# Patient Record
Sex: MALE | Race: WHITE | URBAN - NONMETROPOLITAN AREA
[De-identification: names, ages, dates, MRNs, and addresses within clinical notes are randomized per-mention and may not be internally consistent; named-entity substitution may affect disease eponyms.]

---

## 2021-02-03 ENCOUNTER — OFFICE VISIT (OUTPATIENT)
Dept: PRIMARY CARE CLINIC | Age: 31
End: 2021-02-03
Payer: MEDICAID

## 2021-02-03 VITALS
SYSTOLIC BLOOD PRESSURE: 142 MMHG | HEIGHT: 73 IN | RESPIRATION RATE: 18 BRPM | HEART RATE: 98 BPM | OXYGEN SATURATION: 98 % | BODY MASS INDEX: 41.75 KG/M2 | TEMPERATURE: 97.7 F | WEIGHT: 315 LBS | DIASTOLIC BLOOD PRESSURE: 86 MMHG

## 2021-02-03 DIAGNOSIS — Z13.220 SCREENING CHOLESTEROL LEVEL: ICD-10-CM

## 2021-02-03 DIAGNOSIS — Z86.16 HISTORY OF COVID-19: Primary | ICD-10-CM

## 2021-02-03 DIAGNOSIS — E66.01 OBESITY, MORBID (MORE THAN 100 LBS OVER IDEAL WEIGHT OR BMI > 40) (HCC): ICD-10-CM

## 2021-02-03 DIAGNOSIS — R03.0 ELEVATED BLOOD-PRESSURE READING WITHOUT DIAGNOSIS OF HYPERTENSION: ICD-10-CM

## 2021-02-03 PROCEDURE — G8427 DOCREV CUR MEDS BY ELIG CLIN: HCPCS | Performed by: FAMILY MEDICINE

## 2021-02-03 PROCEDURE — G8484 FLU IMMUNIZE NO ADMIN: HCPCS | Performed by: FAMILY MEDICINE

## 2021-02-03 PROCEDURE — 99204 OFFICE O/P NEW MOD 45 MIN: CPT | Performed by: FAMILY MEDICINE

## 2021-02-03 PROCEDURE — G8417 CALC BMI ABV UP PARAM F/U: HCPCS | Performed by: FAMILY MEDICINE

## 2021-02-03 PROCEDURE — 1036F TOBACCO NON-USER: CPT | Performed by: FAMILY MEDICINE

## 2021-02-03 SDOH — HEALTH STABILITY: MENTAL HEALTH: HOW MANY STANDARD DRINKS CONTAINING ALCOHOL DO YOU HAVE ON A TYPICAL DAY?: NOT ASKED

## 2021-02-03 ASSESSMENT — ENCOUNTER SYMPTOMS
COUGH: 0
ABDOMINAL PAIN: 0
SHORTNESS OF BREATH: 1
DIARRHEA: 0
CHEST TIGHTNESS: 0
CONSTIPATION: 0

## 2021-02-03 NOTE — PROGRESS NOTES
2/3/21  Fran Whitt : 1990 Sex: male  Age: 35 y.o. Assessment and Plan:  Greta King was seen today for establish care. Diagnoses and all orders for this visit:    History of COVID-19  -     Basic Metabolic Panel; Future  -     Hepatic Function Panel; Future  -     CBC Auto Differential; Future  -     Lipid Panel; Future    Screening cholesterol level  -     Basic Metabolic Panel; Future  -     Hepatic Function Panel; Future  -     CBC Auto Differential; Future  -     Lipid Panel; Future    Obesity, morbid (more than 100 lbs over ideal weight or BMI > 40) (HCC)  -     Basic Metabolic Panel; Future  -     Hepatic Function Panel; Future  -     CBC Auto Differential; Future  -     Lipid Panel; Future    Elevated blood-pressure reading without diagnosis of hypertension    Return to work note given for  given. Update routine annual blood work, further recommendations pending results. Patient's partner will check his blood pressure 1-2 times per month and he will keep a record of this. Follow-up in approximately 8 weeks to reassess in office. Return in about 8 weeks (around 3/31/2021).       Chief Complaint   Patient presents with   [facility] Establish Care       HPI Patient is here today to establish care. He recently had COVID-19. His initial symptoms started on January 21 22nd. He just had congestion and thought initially it was allergies or sinus infection. However later on that weekend he lost his sense of smell and taste, and got himself tested. He really just had congestion, some cough, and \"heart palpitations. \"  On further questioning, he states that he felt his heart was racing actually, there was no skipping or flip-flops. There was no associated shortness of breath, dizziness or chest pain with these episodes. He never had a fever. He is well past his 10-day quarantine at this point and does feel better. He notes that he still feels a little bit short of breath if he does too much; he was taking down decorations from [place] and moving them downstairs into a shed, and felt he had to take more breaths then would have been normal.  However for the 14 days he was quarantined, he was exclusively confined to his bedroom, and thus there may be some deconditioning as well. 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 He is not sure he is ready to go back just yet, but thinks by Monday he will have more of his strength back and be ready to properly care for his patient. Patient is otherwise generally healthy. He states that his blood pressure is usually elevated when he comes to the doctors. Denies any other health history of concern. Diabetes type 2 and cardiac disease do run in the family. He would like to update his routine annual blood work. Problem list reviewed and updated in full with patient today as necessary. A comprehensive ROS was negative, except as documented above. Review of Systems   Constitutional: Negative for fever. Respiratory: Positive for shortness of breath. Negative for cough and chest tightness. Cardiovascular: Negative for chest pain. Gastrointestinal: Negative for abdominal pain, constipation and diarrhea. Genitourinary: Negative. Musculoskeletal: Negative for arthralgias and myalgias. Neurological: Negative for dizziness and light-headedness. Psychiatric/Behavioral: Negative for behavioral problems. All other systems reviewed and are negative. No current outpatient medications on file. No Known Allergies    Pt's past medical and surgical history were updated as necessary today   Pt's family and social history were updated as necessary today        Vitals:    02/03/21 0930 02/03/21 0933   BP: (!) 142/86 (!) 142/86   Pulse: 98    Resp: 18    Temp: 97.7 °F (36.5 °C)    TempSrc: Temporal    SpO2: 98%    Weight: (!) 321 lb (145.6 kg)    Height: 6' 1\" (1.854 m)        Physical Exam  Constitutional:       Appearance: Normal appearance. He is obese. HENT:      Head: Normocephalic and atraumatic. Eyes:      Conjunctiva/sclera: Conjunctivae normal.   Cardiovascular:      Rate and Rhythm: Normal rate and regular rhythm. Heart sounds: Normal heart sounds. Pulmonary:      Effort: Pulmonary effort is normal.      Breath sounds: Normal breath sounds. Abdominal:      Palpations: Abdomen is soft. Tenderness: There is no abdominal tenderness. Musculoskeletal: Normal range of motion. Skin:     General: Skin is warm and dry. Neurological:      General: No focal deficit present. Mental Status: He is alert and oriented to person, place, and time.    Psychiatric:         Mood and Affect: Mood normal.         Behavior: Behavior normal.               Seen By:  Juan Antony MD

## 2021-02-03 NOTE — LETTER
NOTIFICATION RETURN TO WORK / SCHOOL    2/3/2021    Mr. Dwayne Moreno  [facility] [phone]      To Whom It May Concern:    Dwayne Moreno has completed his quarantine for COVID and is acceptable to return to work Monday 2/8/2021 per CDC guidelines. If there are questions or concerns, please have the patient contact our office.         Sincerely,      Brionna Chua MD

## 2021-02-05 DIAGNOSIS — Z86.16 HISTORY OF COVID-19: ICD-10-CM

## 2021-02-05 DIAGNOSIS — E66.01 OBESITY, MORBID (MORE THAN 100 LBS OVER IDEAL WEIGHT OR BMI > 40) (HCC): ICD-10-CM

## 2021-02-05 DIAGNOSIS — Z13.220 SCREENING CHOLESTEROL LEVEL: ICD-10-CM

## 2021-02-05 LAB
ALBUMIN SERPL-MCNC: 4.7 G/DL (ref 3.5–5.2)
ALP BLD-CCNC: 72 U/L (ref 40–129)
ALT SERPL-CCNC: 59 U/L (ref 0–40)
ANION GAP SERPL CALCULATED.3IONS-SCNC: 13 MMOL/L (ref 7–16)
AST SERPL-CCNC: 35 U/L (ref 0–39)
BASOPHILS ABSOLUTE: 0.03 E9/L (ref 0–0.2)
BASOPHILS RELATIVE PERCENT: 0.6 % (ref 0–2)
BILIRUB SERPL-MCNC: 0.5 MG/DL (ref 0–1.2)
BILIRUBIN DIRECT: <0.2 MG/DL (ref 0–0.3)
BILIRUBIN, INDIRECT: ABNORMAL MG/DL (ref 0–1)
BUN BLDV-MCNC: 10 MG/DL (ref 6–20)
CALCIUM SERPL-MCNC: 9.6 MG/DL (ref 8.6–10.2)
CHLORIDE BLD-SCNC: 100 MMOL/L (ref 98–107)
CHOLESTEROL, TOTAL: 204 MG/DL (ref 0–199)
CO2: 24 MMOL/L (ref 22–29)
CREAT SERPL-MCNC: 0.9 MG/DL (ref 0.7–1.2)
EOSINOPHILS ABSOLUTE: 0.09 E9/L (ref 0.05–0.5)
EOSINOPHILS RELATIVE PERCENT: 1.9 % (ref 0–6)
GFR AFRICAN AMERICAN: >60
GFR NON-AFRICAN AMERICAN: >60 ML/MIN/1.73
GLUCOSE BLD-MCNC: 88 MG/DL (ref 74–99)
HCT VFR BLD CALC: 42.7 % (ref 37–54)
HDLC SERPL-MCNC: 34 MG/DL
HEMOGLOBIN: 14.3 G/DL (ref 12.5–16.5)
IMMATURE GRANULOCYTES #: 0.01 E9/L
IMMATURE GRANULOCYTES %: 0.2 % (ref 0–5)
LDL CHOLESTEROL CALCULATED: 143 MG/DL (ref 0–99)
LYMPHOCYTES ABSOLUTE: 2.01 E9/L (ref 1.5–4)
LYMPHOCYTES RELATIVE PERCENT: 42.3 % (ref 20–42)
MCH RBC QN AUTO: 29.4 PG (ref 26–35)
MCHC RBC AUTO-ENTMCNC: 33.5 % (ref 32–34.5)
MCV RBC AUTO: 87.7 FL (ref 80–99.9)
MONOCYTES ABSOLUTE: 0.44 E9/L (ref 0.1–0.95)
MONOCYTES RELATIVE PERCENT: 9.3 % (ref 2–12)
NEUTROPHILS ABSOLUTE: 2.17 E9/L (ref 1.8–7.3)
NEUTROPHILS RELATIVE PERCENT: 45.7 % (ref 43–80)
PDW BLD-RTO: 12.6 FL (ref 11.5–15)
PLATELET # BLD: 312 E9/L (ref 130–450)
PMV BLD AUTO: 9.6 FL (ref 7–12)
POTASSIUM SERPL-SCNC: 4.5 MMOL/L (ref 3.5–5)
RBC # BLD: 4.87 E12/L (ref 3.8–5.8)
SODIUM BLD-SCNC: 137 MMOL/L (ref 132–146)
TOTAL PROTEIN: 7.8 G/DL (ref 6.4–8.3)
TRIGL SERPL-MCNC: 133 MG/DL (ref 0–149)
VLDLC SERPL CALC-MCNC: 27 MG/DL
WBC # BLD: 4.8 E9/L (ref 4.5–11.5)

## 2021-05-04 ENCOUNTER — OFFICE VISIT (OUTPATIENT)
Dept: PRIMARY CARE CLINIC | Age: 31
End: 2021-05-04
Payer: MEDICAID

## 2021-05-04 VITALS
HEART RATE: 83 BPM | BODY MASS INDEX: 39.1 KG/M2 | RESPIRATION RATE: 18 BRPM | HEIGHT: 73 IN | SYSTOLIC BLOOD PRESSURE: 132 MMHG | WEIGHT: 295 LBS | DIASTOLIC BLOOD PRESSURE: 84 MMHG | TEMPERATURE: 97.5 F | OXYGEN SATURATION: 98 %

## 2021-05-04 DIAGNOSIS — J01.00 ACUTE NON-RECURRENT MAXILLARY SINUSITIS: Primary | ICD-10-CM

## 2021-05-04 DIAGNOSIS — R79.89 ELEVATED LFTS: ICD-10-CM

## 2021-05-04 PROCEDURE — G8427 DOCREV CUR MEDS BY ELIG CLIN: HCPCS | Performed by: FAMILY MEDICINE

## 2021-05-04 PROCEDURE — G8417 CALC BMI ABV UP PARAM F/U: HCPCS | Performed by: FAMILY MEDICINE

## 2021-05-04 PROCEDURE — 1036F TOBACCO NON-USER: CPT | Performed by: FAMILY MEDICINE

## 2021-05-04 PROCEDURE — 99213 OFFICE O/P EST LOW 20 MIN: CPT | Performed by: FAMILY MEDICINE

## 2021-05-04 RX ORDER — METHYLPREDNISOLONE 4 MG/1
TABLET ORAL
Qty: 21 TABLET | Refills: 0 | Status: SHIPPED | OUTPATIENT
Start: 2021-05-04

## 2021-05-04 ASSESSMENT — PATIENT HEALTH QUESTIONNAIRE - PHQ9
2. FEELING DOWN, DEPRESSED OR HOPELESS: 0
SUM OF ALL RESPONSES TO PHQ QUESTIONS 1-9: 0
SUM OF ALL RESPONSES TO PHQ QUESTIONS 1-9: 0
SUM OF ALL RESPONSES TO PHQ9 QUESTIONS 1 & 2: 0
1. LITTLE INTEREST OR PLEASURE IN DOING THINGS: 0

## 2021-05-04 NOTE — LETTER
[address]  Phone: [phone]  Fax: [phone]    Santiago Gomez MD        May 4, 2021     Patient: Janice Estrada   YOB: 1990   Date of Visit: 5/4/2021       To Whom it May Concern:    Tish Hilliard was seen in my clinic on 5/4/2021. If you have any questions or concerns, please don't hesitate to call.     Sincerely,         Santiago Gomez MD

## 2021-05-04 NOTE — PROGRESS NOTES
21  Balta Clau : 1990 Sex: male  Age: 35 y.o. Assessment and Plan:  Cedrick Thacker was seen today for 3 month follow-up. Diagnoses and all orders for this visit:    Acute non-recurrent maxillary sinusitis  -     methylPREDNISolone (MEDROL DOSEPACK) 4 MG tablet; Take by mouth as per dosepak instructions. Elevated LFTs  -     HEPATIC FUNCTION PANEL; Future    We will treat his presumed allergic sinusitis with a Medrol Dosepak. Precautions reviewed. We will plan to recheck his blood pressure again in 6 months. Repeat LFTs prior. Patient was congratulated on his weight loss and encouraged to continue with his lifestyle efforts. Return in about 6 months (around 2021).         Chief Complaint   Patient presents with    3 Month Follow-Up       HPI   Here for routine f/u  He is overall doing well    Pt is noting some sinus congestion, worried he might be getting an infection  Was on vacation last week in [place] and sx started there  Congested  Mucous now yellow  No fevers  Doesn't really feel sick, \"just congested\"  Used Sudafed with some relief   Pt admits to underlying allergies but doesn't get a lot of improvement from anti-histamines     BP good today   He is feeling well  Has lost some weight - not working overnight now and watching what he's eating now  New job, working for [employer] from February reviewed:  Component      Latest Ref Rng & Units 2021           9:37 AM  9:37 AM  9:37 AM  9:37 AM   WBC      4.5 - 11.5 E9/L    4.8   RBC      3.80 - 5.80 E12/L    4.87   Hemoglobin Quant      12.5 - 16.5 g/dL    14.3   Hematocrit      37.0 - 54.0 %    42.7   MCV      80.0 - 99.9 fL    87.7   MCH      26.0 - 35.0 pg    29.4   MCHC      32.0 - 34.5 %    33.5   RDW      11.5 - 15.0 fL    12.6   Platelet Count      575 - 450 E9/L    312   MPV      7.0 - 12.0 fL    9.6   Neutrophils %      43.0 - 80.0 %    45.7   Immature Granulocytes %      0.0 - 5.0 % 0.2   Lymphocyte %      20.0 - 42.0 %    42.3 (H)   Monocytes %      2.0 - 12.0 %    9.3   Eosinophils %      0.0 - 6.0 %    1.9   Basophils %      0.0 - 2.0 %    0.6   Neutrophils Absolute      1.80 - 7.30 E9/L    2.17   Immature Granulocytes #      E9/L    0.01   Lymphocytes Absolute      1.50 - 4.00 E9/L    2.01   Monocytes Absolute      0.10 - 0.95 E9/L    0.44   Eosinophils Absolute      0.05 - 0.50 E9/L    0.09   Basophils Absolute      0.00 - 0.20 E9/L    0.03   Sodium      132 - 146 mmol/L   137    Potassium      3.5 - 5.0 mmol/L   4.5    Chloride      98 - 107 mmol/L   100    CO2      22 - 29 mmol/L   24    Anion Gap      7 - 16 mmol/L   13    Glucose      74 - 99 mg/dL   88    BUN      6 - 20 mg/dL   10    Creatinine      0.7 - 1.2 mg/dL   0.9    GFR Non-African American      >=60 mL/min/1.73   >60    GFR          >60    Calcium      8.6 - 10.2 mg/dL   9.6    Total Protein      6.4 - 8.3 g/dL  7.8     Albumin      3.5 - 5.2 g/dL  4.7     Alk Phos      40 - 129 U/L  72     ALT      0 - 40 U/L  59 (H)     AST      0 - 39 U/L  35     Bilirubin      0.0 - 1.2 mg/dL  0.5     Bilirubin, Direct      0.0 - 0.3 mg/dL  <0.2     Bilirubin, Indirect      0.0 - 1.0 mg/dL  see below     Cholesterol, Total      0 - 199 mg/dL 204 (H)      Triglycerides      0 - 149 mg/dL 133      HDL Cholesterol      >40 mg/dL 34      LDL Calculated      0 - 99 mg/dL 143 (H)      VLDL Cholesterol Calculated      mg/dL 27          Problem list reviewed and updated in full with patient today as necessary. A comprehensive ROS was negative, except as documented above. Current Outpatient Medications:     methylPREDNISolone (MEDROL DOSEPACK) 4 MG tablet, Take by mouth as per dosepak instructions. , Disp: 21 tablet, Rfl: 0  No Known Allergies    Pt's past medical and surgical history were updated as necessary today   Pt's family and social history were updated as necessary today        Vitals:    05/04/21 1307   BP: 132/84   Pulse: 83   Resp: 18   Temp: 97.5 °F (36.4 °C)   TempSrc: Temporal   SpO2: 98%   Weight: 295 lb (133.8 kg)   Height: 6' 1\" (1.854 m)       Physical Exam  Constitutional:       Appearance: Normal appearance. HENT:      Head: Normocephalic and atraumatic. Eyes:      Conjunctiva/sclera: Conjunctivae normal.   Cardiovascular:      Rate and Rhythm: Normal rate and regular rhythm. Heart sounds: Normal heart sounds. Pulmonary:      Effort: Pulmonary effort is normal.      Breath sounds: Normal breath sounds. Abdominal:      Palpations: Abdomen is soft. Tenderness: There is no abdominal tenderness. Musculoskeletal: Normal range of motion. Skin:     General: Skin is warm and dry. Neurological:      General: No focal deficit present. Mental Status: He is alert and oriented to person, place, and time.    Psychiatric:         Mood and Affect: Mood normal.         Behavior: Behavior normal.       Seen By:  Lyndsey Shetty MD